# Patient Record
Sex: FEMALE | Race: WHITE | NOT HISPANIC OR LATINO | Employment: OTHER | URBAN - METROPOLITAN AREA
[De-identification: names, ages, dates, MRNs, and addresses within clinical notes are randomized per-mention and may not be internally consistent; named-entity substitution may affect disease eponyms.]

---

## 2021-06-24 ENCOUNTER — TRANSCRIBE ORDERS (OUTPATIENT)
Dept: LAB | Facility: SURGERY CENTER | Age: 61
End: 2021-06-24

## 2021-06-24 ENCOUNTER — PREP FOR SURGERY (OUTPATIENT)
Dept: SURGERY | Facility: SURGERY CENTER | Age: 61
End: 2021-06-24

## 2021-06-24 ENCOUNTER — OFFICE VISIT (OUTPATIENT)
Dept: GASTROENTEROLOGY | Facility: CLINIC | Age: 61
End: 2021-06-24

## 2021-06-24 VITALS
TEMPERATURE: 97.1 F | BODY MASS INDEX: 27.25 KG/M2 | HEART RATE: 74 BPM | WEIGHT: 184 LBS | DIASTOLIC BLOOD PRESSURE: 90 MMHG | OXYGEN SATURATION: 97 % | HEIGHT: 69 IN | SYSTOLIC BLOOD PRESSURE: 132 MMHG

## 2021-06-24 DIAGNOSIS — Z15.09 MONOALLELIC MUTATION OF CHEK2 GENE IN FEMALE PATIENT: Primary | ICD-10-CM

## 2021-06-24 DIAGNOSIS — Z31.438 OTHER GENETIC TESTING OF FEMALE: Primary | ICD-10-CM

## 2021-06-24 DIAGNOSIS — Z01.818 OTHER SPECIFIED PRE-OPERATIVE EXAMINATION: Primary | ICD-10-CM

## 2021-06-24 DIAGNOSIS — Z12.11 ENCOUNTER FOR SCREENING FOR MALIGNANT NEOPLASM OF COLON: ICD-10-CM

## 2021-06-24 DIAGNOSIS — Z15.09 MONOALLELIC MUTATION OF CHEK2 GENE IN FEMALE PATIENT: ICD-10-CM

## 2021-06-24 DIAGNOSIS — Z15.89 MONOALLELIC MUTATION OF CHEK2 GENE IN FEMALE PATIENT: ICD-10-CM

## 2021-06-24 DIAGNOSIS — Z15.01 MONOALLELIC MUTATION OF CHEK2 GENE IN FEMALE PATIENT: Primary | ICD-10-CM

## 2021-06-24 DIAGNOSIS — Z15.89 MONOALLELIC MUTATION OF CHEK2 GENE IN FEMALE PATIENT: Primary | ICD-10-CM

## 2021-06-24 DIAGNOSIS — Z15.02 MONOALLELIC MUTATION OF CHEK2 GENE IN FEMALE PATIENT: Primary | ICD-10-CM

## 2021-06-24 DIAGNOSIS — Z31.438 OTHER GENETIC TESTING OF FEMALE: ICD-10-CM

## 2021-06-24 DIAGNOSIS — Z15.01 MONOALLELIC MUTATION OF CHEK2 GENE IN FEMALE PATIENT: ICD-10-CM

## 2021-06-24 DIAGNOSIS — Z15.02 MONOALLELIC MUTATION OF CHEK2 GENE IN FEMALE PATIENT: ICD-10-CM

## 2021-06-24 PROCEDURE — 99244 OFF/OP CNSLTJ NEW/EST MOD 40: CPT | Performed by: INTERNAL MEDICINE

## 2021-06-24 RX ORDER — SODIUM CHLORIDE, SODIUM LACTATE, POTASSIUM CHLORIDE, CALCIUM CHLORIDE 600; 310; 30; 20 MG/100ML; MG/100ML; MG/100ML; MG/100ML
30 INJECTION, SOLUTION INTRAVENOUS CONTINUOUS PRN
Status: CANCELLED | OUTPATIENT
Start: 2021-06-24

## 2021-06-24 RX ORDER — PROPRANOLOL HYDROCHLORIDE 20 MG/1
20 TABLET ORAL DAILY
COMMUNITY
Start: 2021-04-19

## 2021-06-24 RX ORDER — METAXALONE 800 MG/1
800 TABLET ORAL 3 TIMES DAILY
COMMUNITY
Start: 2009-08-01

## 2021-06-24 RX ORDER — SODIUM CHLORIDE 0.9 % (FLUSH) 0.9 %
10 SYRINGE (ML) INJECTION AS NEEDED
Status: CANCELLED | OUTPATIENT
Start: 2021-06-24

## 2021-06-24 RX ORDER — SODIUM CHLORIDE 0.9 % (FLUSH) 0.9 %
3 SYRINGE (ML) INJECTION EVERY 12 HOURS SCHEDULED
Status: CANCELLED | OUTPATIENT
Start: 2021-06-24

## 2021-06-24 RX ORDER — CLINDAMYCIN PHOSPHATE 10 UG/ML
LOTION TOPICAL
COMMUNITY
Start: 2021-03-29

## 2021-06-24 RX ORDER — ESTRADIOL 0.05 MG/D
PATCH TRANSDERMAL
COMMUNITY
Start: 2021-06-16

## 2021-06-24 RX ORDER — SUCRALFATE 1 G/1
1 TABLET ORAL 3 TIMES DAILY
COMMUNITY
Start: 2021-03-24

## 2021-06-24 RX ORDER — RABEPRAZOLE SODIUM 20 MG/1
20 TABLET, DELAYED RELEASE ORAL EVERY MORNING
COMMUNITY
Start: 2021-04-21

## 2021-06-24 RX ORDER — CYANOCOBALAMIN (VITAMIN B-12) 500 MCG
LOZENGE ORAL
COMMUNITY

## 2021-06-24 RX ORDER — LEVOTHYROXINE SODIUM 0.1 MG/1
100 TABLET ORAL DAILY
COMMUNITY
Start: 2021-06-02

## 2021-06-24 RX ORDER — SOLIFENACIN SUCCINATE 5 MG/1
5 TABLET, FILM COATED ORAL DAILY
COMMUNITY
Start: 2021-06-03 | End: 2022-01-27

## 2021-06-24 NOTE — PROGRESS NOTES
"Chief Complaint   Patient presents with   • other     Discuss-Genetic testing and colonoscopy        {CC  Problem List  Visit Diagnosis   Encounters  Notes  Medications  Labs  Result Review Imaging  Media :23}   History of Present Illness  60-year-old female presents today for abnormal genetic testing.  She has had CHEK-2 positive genetic testing.  No family history of colon cancer or colon polyps.    She has longstanding history of IBS abdominal pain.  Previous evaluation with Dr. Jade September 2018 at [facility] for longstanding IBS and acid reflux.    She has had numerous surgeries including brain cyst, ear surgery and numerous GI surgeries with multiple adhesions and reports that she has never had a colonoscopy before.    She has had several Cologuard's in the past that have been normal.     She has regular bowel movements and works hard to avoid constipation or symptoms concerning for small bowel obstruction given her history of bowel obstruction.    Acid reflux is controlled with daily acid medication.  She denies pain or trouble with swallowing.    Review of Systems     Result Review :         Vital Signs:   /90   Pulse 74   Temp 97.1 °F (36.2 °C)   Ht 174 cm (68.5\")   Wt 83.5 kg (184 lb)   SpO2 97%   BMI 27.57 kg/m²     Body mass index is 27.57 kg/m².     Physical Exam  Vitals reviewed.   Constitutional:       Appearance: Normal appearance.   HENT:      Nose: No nasal deformity.   Eyes:      General: No scleral icterus.  Neck:      Comments: Trachea midline.  Cardiovascular:      Rate and Rhythm: Normal rate and regular rhythm.   Pulmonary:      Effort: No respiratory distress.      Breath sounds: Normal breath sounds.   Abdominal:      General: Bowel sounds are normal. There is no distension.      Palpations: Abdomen is soft. There is no mass.      Tenderness: There is no abdominal tenderness.   Lymphadenopathy:      Comments: No periumbilical lymphadenopathy.   Skin:     General: Skin " is warm.   Neurological:      Mental Status: She is alert.         Assessment and Plan    Diagnoses and all orders for this visit:    1. 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 Other genetic testing of female    3. Encounter for screening for malignant neoplasm of colon       Pleasant 60-year-old female presents today for consult regarding abnormal genetic testing discussed testing as this carries increased risk for cancers including colon.  Discussed risks and benefit of endoscopic evaluation with abnormal genetic testing.  Given significant history of scar tissue, will proceed cautiously as discussed.  Unfortunately abnormal genetic testing typically comes with the recommendation for endoscopic evaluation every 1 to 2 years and patient's ultimate goal is to avoid invasive procedures given significant surgical history and adhesions if at all possible.    Given longstanding reflux, will proceed with EGD and colonoscopy for abnormal genetic testing and colon cancer screening.  Based on findings of colonoscopy, will make additional recommendations.    Patient verbalized agreement and understanding with the above plan, all questions answered and support provided.    I have reviewed and confirmed the accuracy of the HPI and Assessment and Plan as documented by the APRN Grayson Zacarias MD

## 2021-10-04 ENCOUNTER — LAB (OUTPATIENT)
Dept: LAB | Facility: SURGERY CENTER | Age: 61
End: 2021-10-04

## 2021-10-04 DIAGNOSIS — Z01.818 OTHER SPECIFIED PRE-OPERATIVE EXAMINATION: ICD-10-CM

## 2021-10-04 LAB — SARS-COV-2 ORF1AB RESP QL NAA+PROBE: NOT DETECTED

## 2021-10-04 PROCEDURE — U0004 COV-19 TEST NON-CDC HGH THRU: HCPCS | Performed by: SURGERY

## 2021-10-04 PROCEDURE — C9803 HOPD COVID-19 SPEC COLLECT: HCPCS

## 2021-10-04 NOTE — SIGNIFICANT NOTE
Education provided the Patient on the following:    - Nothing to Eat or Drink after MN the night before the procedure  -Your required COVID Test is Scheduled on          Between the Hours of [id number]  -You will only be notified if your COVID test Result is POSITIVE  -The importance of reducing your number of contacts by self quarantining after you COVID test until the date of your Colonoscopy and EGD    - Avoid red/purple fluids while completing their bowel prep as ordered by physician  -Contact Gastrointerologist office for any questions about specific details regarding colon prep    -You will need to have someone drive you home after your colonoscopy and remain with you for 24 hours after the procedure  - The date of your procedure, your are welcome to have one visitor at bedside or remain within 10-15 minutes of [facility]  -Please wear warm socks when you arrive for your procedure  -Remove all jewelry and leave any valuables before arriving the day of your procedure (all will have to be removed before leaving preop)  -You will need to arrive at    7:15       on     10/6      for your procedure    -Feel free to contact us at: [phone] with any additional questions/concerns

## 2021-10-06 ENCOUNTER — ANESTHESIA (OUTPATIENT)
Dept: SURGERY | Facility: SURGERY CENTER | Age: 61
End: 2021-10-06

## 2021-10-06 ENCOUNTER — HOSPITAL ENCOUNTER (OUTPATIENT)
Facility: SURGERY CENTER | Age: 61
Setting detail: HOSPITAL OUTPATIENT SURGERY
Discharge: HOME | End: 2021-10-06
Attending: INTERNAL MEDICINE | Admitting: INTERNAL MEDICINE

## 2021-10-06 ENCOUNTER — ANESTHESIA EVENT (OUTPATIENT)
Dept: SURGERY | Facility: SURGERY CENTER | Age: 61
End: 2021-10-06

## 2021-10-06 VITALS
SYSTOLIC BLOOD PRESSURE: 95 MMHG | WEIGHT: 176.2 LBS | OXYGEN SATURATION: 96 % | TEMPERATURE: 97.8 F | RESPIRATION RATE: 16 BRPM | DIASTOLIC BLOOD PRESSURE: 62 MMHG | HEART RATE: 65 BPM | HEIGHT: 69 IN | BODY MASS INDEX: 26.1 KG/M2

## 2021-10-06 DIAGNOSIS — Z12.11 ENCOUNTER FOR SCREENING FOR MALIGNANT NEOPLASM OF COLON: ICD-10-CM

## 2021-10-06 DIAGNOSIS — Z15.09 MONOALLELIC MUTATION OF CHEK2 GENE IN FEMALE PATIENT: ICD-10-CM

## 2021-10-06 DIAGNOSIS — Z15.89 MONOALLELIC MUTATION OF CHEK2 GENE IN FEMALE PATIENT: ICD-10-CM

## 2021-10-06 DIAGNOSIS — Z31.438 OTHER GENETIC TESTING OF FEMALE: ICD-10-CM

## 2021-10-06 DIAGNOSIS — Z15.01 MONOALLELIC MUTATION OF CHEK2 GENE IN FEMALE PATIENT: ICD-10-CM

## 2021-10-06 DIAGNOSIS — Z15.02 MONOALLELIC MUTATION OF CHEK2 GENE IN FEMALE PATIENT: ICD-10-CM

## 2021-10-06 PROCEDURE — 45378 DIAGNOSTIC COLONOSCOPY: CPT | Performed by: INTERNAL MEDICINE

## 2021-10-06 PROCEDURE — 0DJD8ZZ INSPECTION OF LOWER INTESTINAL TRACT, VIA NATURAL OR ARTIFICIAL OPENING ENDOSCOPIC: ICD-10-PCS | Performed by: NURSE PRACTITIONER

## 2021-10-06 PROCEDURE — 25010000002 PROPOFOL 10 MG/ML EMULSION: Performed by: ANESTHESIOLOGY

## 2021-10-06 PROCEDURE — 43235 EGD DIAGNOSTIC BRUSH WASH: CPT | Performed by: NURSE PRACTITIONER

## 2021-10-06 PROCEDURE — 43235 EGD DIAGNOSTIC BRUSH WASH: CPT | Performed by: INTERNAL MEDICINE

## 2021-10-06 PROCEDURE — 45378 DIAGNOSTIC COLONOSCOPY: CPT | Performed by: NURSE PRACTITIONER

## 2021-10-06 PROCEDURE — 0DJ08ZZ INSPECTION OF UPPER INTESTINAL TRACT, VIA NATURAL OR ARTIFICIAL OPENING ENDOSCOPIC: ICD-10-PCS | Performed by: NURSE PRACTITIONER

## 2021-10-06 RX ORDER — FENTANYL CITRATE 50 UG/ML
50 INJECTION, SOLUTION INTRAMUSCULAR; INTRAVENOUS
Status: DISCONTINUED | OUTPATIENT
Start: 2021-10-06 | End: 2021-10-06 | Stop reason: HOSPADM

## 2021-10-06 RX ORDER — FLUMAZENIL 0.1 MG/ML
0.2 INJECTION INTRAVENOUS AS NEEDED
Status: DISCONTINUED | OUTPATIENT
Start: 2021-10-06 | End: 2021-10-06 | Stop reason: HOSPADM

## 2021-10-06 RX ORDER — DIPHENHYDRAMINE HYDROCHLORIDE 50 MG/ML
12.5 INJECTION INTRAMUSCULAR; INTRAVENOUS
Status: DISCONTINUED | OUTPATIENT
Start: 2021-10-06 | End: 2021-10-06 | Stop reason: HOSPADM

## 2021-10-06 RX ORDER — MAGNESIUM HYDROXIDE 1200 MG/15ML
LIQUID ORAL AS NEEDED
Status: DISCONTINUED | OUTPATIENT
Start: 2021-10-06 | End: 2021-10-06 | Stop reason: HOSPADM

## 2021-10-06 RX ORDER — PROPOFOL 10 MG/ML
VIAL (ML) INTRAVENOUS AS NEEDED
Status: DISCONTINUED | OUTPATIENT
Start: 2021-10-06 | End: 2021-10-06 | Stop reason: SURG

## 2021-10-06 RX ORDER — LIDOCAINE HYDROCHLORIDE 20 MG/ML
INJECTION, SOLUTION INFILTRATION; PERINEURAL AS NEEDED
Status: DISCONTINUED | OUTPATIENT
Start: 2021-10-06 | End: 2021-10-06 | Stop reason: SURG

## 2021-10-06 RX ORDER — NALOXONE HCL 0.4 MG/ML
0.2 VIAL (ML) INJECTION AS NEEDED
Status: DISCONTINUED | OUTPATIENT
Start: 2021-10-06 | End: 2021-10-06 | Stop reason: HOSPADM

## 2021-10-06 RX ORDER — SODIUM CHLORIDE, SODIUM LACTATE, POTASSIUM CHLORIDE, CALCIUM CHLORIDE 600; 310; 30; 20 MG/100ML; MG/100ML; MG/100ML; MG/100ML
30 INJECTION, SOLUTION INTRAVENOUS CONTINUOUS PRN
Status: DISCONTINUED | OUTPATIENT
Start: 2021-10-06 | End: 2021-10-06 | Stop reason: HOSPADM

## 2021-10-06 RX ORDER — SODIUM CHLORIDE 0.9 % (FLUSH) 0.9 %
10 SYRINGE (ML) INJECTION AS NEEDED
Status: DISCONTINUED | OUTPATIENT
Start: 2021-10-06 | End: 2021-10-06 | Stop reason: HOSPADM

## 2021-10-06 RX ORDER — SODIUM CHLORIDE 0.9 % (FLUSH) 0.9 %
3 SYRINGE (ML) INJECTION EVERY 12 HOURS SCHEDULED
Status: DISCONTINUED | OUTPATIENT
Start: 2021-10-06 | End: 2021-10-06 | Stop reason: HOSPADM

## 2021-10-06 RX ORDER — IBUPROFEN 200 MG
600 TABLET ORAL ONCE AS NEEDED
Status: DISCONTINUED | OUTPATIENT
Start: 2021-10-06 | End: 2021-10-06 | Stop reason: HOSPADM

## 2021-10-06 RX ORDER — DIPHENHYDRAMINE HCL 25 MG
25 TABLET ORAL
Status: DISCONTINUED | OUTPATIENT
Start: 2021-10-06 | End: 2021-10-06 | Stop reason: HOSPADM

## 2021-10-06 RX ADMIN — SODIUM CHLORIDE, POTASSIUM CHLORIDE, SODIUM LACTATE AND CALCIUM CHLORIDE 30 ML/HR: 600; 310; 30; 20 INJECTION, SOLUTION INTRAVENOUS at 07:50

## 2021-10-06 RX ADMIN — PROPOFOL 140 MCG/KG/MIN: 10 INJECTION, EMULSION INTRAVENOUS at 08:42

## 2021-10-06 RX ADMIN — PROPOFOL 100 MG: 10 INJECTION, EMULSION INTRAVENOUS at 08:40

## 2021-10-06 RX ADMIN — LIDOCAINE HYDROCHLORIDE 100 MG: 20 INJECTION, SOLUTION INFILTRATION; PERINEURAL at 08:40

## 2021-10-06 NOTE — ANESTHESIA PREPROCEDURE EVALUATION
" Anesthesia Evaluation     Patient summary reviewed and Nursing notes reviewed   no history of anesthetic complications:  NPO Solid Status: > 8 hours  NPO Liquid Status: > 2 hours           Airway   Mallampati: II  Dental - normal exam     Pulmonary    (+) a smoker Former,   Cardiovascular - negative cardio ROS  Exercise tolerance: good (4-7 METS)        Neuro/Psych- negative ROS  GI/Hepatic/Renal/Endo    (+)  GERD,  thyroid problem hypothyroidism    Musculoskeletal (-) negative ROS    Abdominal    Substance History - negative use     OB/GYN negative ob/gyn ROS         Other      history of cancer                    Anesthesia Plan    ASA 2     MAC   (/89 (BP Location: Left arm, Patient Position: Lying)   Pulse 81   Temp 36.2 °C (97.1 °F) (Temporal)   Resp 16   Ht 175.3 cm (69\")   Wt 79.9 kg (176 lb 3.2 oz)   SpO2 96%   BMI 26.02 kg/m²     I have reviewed the patient's history with the patient and the chart, including all pertinent laboratory results and imaging. I have explained the risks of anesthesia including but not limited to dental damage, corneal abrasion, nerve injury, MI, stroke, and death.    )    Anesthetic plan, all risks, benefits, and alternatives have been provided, discussed and informed consent has been obtained with: patient.      "

## 2021-10-06 NOTE — H&P
No chief complaint on file.      HPI  CHEK 2 mutation  Adhesions           Problem List:    Patient Active Problem List   Diagnosis   • Other genetic testing of female   • Monoallelic mutation of CHEK2 gene in female patient   • Encounter for screening for malignant neoplasm of colon       Medical History:    Past Medical History:   Diagnosis Date   • Cataracts, bilateral    • GERD (gastroesophageal reflux disease)    • Hashimoto's thyroiditis    • Ovarian ca (HCC)    • Small bowel obstruction (HCC)         Social History:    Social History     Socioeconomic History   • Marital status: Single     Spouse name: Not on file   • Number of children: Not on file   • Years of education: Not on file   • Highest education level: Not on file   Tobacco Use   • Smoking status: Former Smoker     Packs/day: 0.50     Years: 20.00     Pack years: 10.00     Types: Cigarettes     Quit date: 2005     Years since quittin.7   • Smokeless tobacco: Never Used   • Tobacco comment:    Vaping Use   • Vaping Use: Never used   Substance and Sexual Activity   • Alcohol use: Yes     Alcohol/week: 5.0 standard drinks     Types: 5 Glasses of wine per week     Comment: SOC   • Drug use: Never   • Sexual activity: Not Currently     Partners: Male     Birth control/protection: Post-menopausal     Comment: hysterectomy at 32       Family History:   Family History   Problem Relation Age of Onset   • Colon cancer Neg Hx    • Colon polyps Neg Hx    • Crohn's disease Neg Hx    • Irritable bowel syndrome Neg Hx    • Ulcerative colitis Neg Hx        Surgical History:   Past Surgical History:   Procedure Laterality Date   • ABDOMINAL SURGERY  1996    Small bowel obstruction   • ARTHROPLASTY      [facility]   • BRAIN TUMOR EXCISION     • BREAST SURGERY      Left- Biposy   • HYSTERECTOMY  1992    radical hyst.due to ovarian ca   • LUMBAR DISC SURGERY      Ruptured   • PAROTIDECTOMY     • TUMOR REMOVAL     • UPPER GASTROINTESTINAL ENDOSCOPY       2012       No current facility-administered medications for this encounter.    Current Outpatient Medications:   •  Climara 0.05 MG/24HR patch, UNWRAP AND APPLY 1 PATCH ONCE A WEEK, Disp: , Rfl:   •  clindamycin (CLEOCIN T) 1 % lotion, APPLY TO FACE EVERY MORNING, Disp: , Rfl:   •  Diclofenac Sodium (VOLTAREN) 1 % gel gel, Place  on the skin as directed by provider 4 (Four) Times a Day., Disp: , Rfl:   •  hyoscyamine (LEVSIN) 0.125 MG SL tablet, DISSOLVE 1 TO 2 TABLETS UNDER THE TONGUE EVERY 4 HOURS AS NEEDED, Disp: , Rfl:   •  levothyroxine (SYNTHROID, LEVOTHROID) 100 MCG tablet, Take 100 mcg by mouth Daily., Disp: , Rfl:   •  metaxalone (SKELAXIN) 800 MG tablet, Take 800 mg by mouth 3 (Three) Times a Day., Disp: , Rfl:   •  propranolol (INDERAL) 20 MG tablet, Take 20 mg by mouth Daily., Disp: , Rfl:   •  RABEprazole (ACIPHEX) 20 MG EC tablet, Take 20 mg by mouth Every Morning., Disp: , Rfl:   •  solifenacin (VESICARE) 5 MG tablet, Take 5 mg by mouth Daily., Disp: , Rfl:   •  sucralfate (CARAFATE) 1 g tablet, Take 1 g by mouth 3 (Three) Times a Day., Disp: , Rfl:   •  tretinoin (RETIN-A) 0.025 % cream, USE FOR ACNE ON FACE EVERY EVENING, Disp: , Rfl:   •  Vitamin E 400 units tablet, Take  by mouth., Disp: , Rfl:     Allergies:   Allergies   Allergen Reactions   • Codeine Hives   • Relafen [Nabumetone] Hives   • Shellfish-Derived Products Other (See Comments)     Unknown   • Morphine GI Intolerance        The following portions of the patient's history were reviewed by me and updated as appropriate: review of systems, allergies, current medications, past family history, past medical history, past social history, past surgical history and problem list.    There were no vitals filed for this visit.    PHYSICAL EXAM:    CONSTITUTIONAL:  today's vital signs reviewed by me  GASTROINTESTINAL: abdomen is soft nontender nondistended with normal active bowel sounds, no masses are appreciated    Assessment/ Plan  CHECK 2  mutation    egd and colonoscopy     Risks and benefits as well as alternatives to endoscopic evaluation were explained to the patient and they voiced understanding and wish to proceed.  These risks include but are not limited to the risk of bleeding, perforation, adverse reaction to sedation, and missed lesions.  The patient was given the opportunity to ask questions prior to the endoscopic procedure.

## 2021-10-06 NOTE — ANESTHESIA POSTPROCEDURE EVALUATION
"Patient: Mami Obando    Procedure Summary     Date: 10/06/21 Room / Location: [facility]    Anesthesia Start: 0838 Anesthesia Stop: 0905    Procedures:       ESOPHAGOGASTRODUODENOSCOPY (N/A )      COLONOSCOPY FOR SCREENING (N/A ) Diagnosis:       Other genetic testing of female      Monoallelic mutation of CHEK2 gene in female patient      Encounter for screening for malignant neoplasm of colon      (Other genetic testing of female [Z31.438])      (Monoallelic mutation of CHEK2 gene in female patient [Z15.01, Z15.89, Z15.09, Z15.02])      (Encounter for screening for malignant neoplasm of colon [Z12.11])    Surgeons: Grayson Zacarias MD Provider: Karen Whitley MD    Anesthesia Type: MAC ASA Status: 2          Anesthesia Type: MAC    Vitals  No vitals data found for the desired time range.          Post Anesthesia Care and Evaluation    Patient location during evaluation: bedside  Patient participation: complete - patient participated  Level of consciousness: awake  Pain management: adequate  Airway patency: patent  Anesthetic complications: No anesthetic complications  PONV Status: controlled  Cardiovascular status: acceptable  Respiratory status: acceptable  Hydration status: acceptable    Comments: /89 (BP Location: Left arm, Patient Position: Lying)   Pulse 81   Temp 36.2 °C (97.1 °F) (Temporal)   Resp 16   Ht 175.3 cm (69\")   Wt 79.9 kg (176 lb 3.2 oz)   SpO2 96%   BMI 26.02 kg/m²         "

## 2021-10-07 DIAGNOSIS — K57.90 DIVERTICULOSIS: Primary | ICD-10-CM

## 2021-12-01 ENCOUNTER — TELEPHONE (OUTPATIENT)
Dept: GASTROENTEROLOGY | Facility: CLINIC | Age: 61
End: 2021-12-01

## 2021-12-29 DIAGNOSIS — K57.90 DIVERTICULOSIS: ICD-10-CM

## 2022-01-24 NOTE — PROGRESS NOTES
"Chief Complaint   Patient presents with   • Colonoscopy     follow up           History of Present Illness  Patient is a 61-year-old female who presents today for follow-up.  Previous genetic testing showed Mutation of Chek 2 gene EGD was performed October 2021 and was normal.  Colonoscopy was attempted and was unable to be advanced past the sigmoid colon.  She then had a virtual colonoscopy performed which was negative.    Patient presents today for follow-up.  She denies any GI complaints.  She has a history of multiple abdominal surgeries, adhesions, and has had a bowel obstruction.    She reports she had difficulty tolerating the magnesium citrate prep for the virtual colonoscopy.     Result Review :       CT Virtual Colonoscopy Diagnostic (12/16/2021)   COLONOSCOPY (10/06/2021 08:31)   UPPER GI ENDOSCOPY (10/06/2021 08:34)   Office Visit with Grayson Zacarias MD (06/24/2021)       Vital Signs:   /78   Pulse (!) 44   Temp 98.2 °F (36.8 °C)   Ht 175.3 cm (69\")   Wt 81.2 kg (179 lb)   SpO2 97%   BMI 26.43 kg/m²     Body mass index is 26.43 kg/m².     Physical Exam  Vitals reviewed.   Constitutional:       General: She is not in acute distress.     Appearance: She is well-developed.   HENT:      Head: Normocephalic and atraumatic.   Pulmonary:      Effort: Pulmonary effort is normal. No respiratory distress.   Skin:     General: Skin is dry.      Coloration: Skin is not pale.   Neurological:      Mental Status: She is alert and oriented to person, place, and time.   Psychiatric:         Thought Content: Thought content normal.           Assessment and Plan    Diagnoses and all orders for this visit:    1. Monoallelic mutation of CHEK2 gene in female patient (Primary)         Discussion  Patient presents today for follow-up after EGD, colonoscopy, and virtual colonoscopy.  Colonoscopy was unable to be advanced past the sigmoid colon, likely secondary to adhesions from prior abdominal surgeries.  CT " colonography was negative.  She does have an increased risk for colorectal cancer secondary to mutation in the CHEK2 gene.  Due to increased risk of colorectal cancer, recommended next colon cancer screening at 2 to 3-year interval.  We discussed consideration for reattempting colonoscopy at this time versus proceeding directly with CT colonography.  If CT colonography had any positive findings, would then recommend colonoscopy.  After our discussion, patient would like to proceed with CT colonography.  We will plan to complete this in the next 2 to 3 years, it can be performed sooner if she develops any new or worsening symptoms.          Follow Up   Return if symptoms worsen or fail to improve.    There are no Patient Instructions on file for this visit.

## 2022-01-27 ENCOUNTER — OFFICE VISIT (OUTPATIENT)
Dept: GASTROENTEROLOGY | Facility: CLINIC | Age: 62
End: 2022-01-27

## 2022-01-27 VITALS
HEIGHT: 69 IN | DIASTOLIC BLOOD PRESSURE: 78 MMHG | SYSTOLIC BLOOD PRESSURE: 122 MMHG | HEART RATE: 44 BPM | WEIGHT: 179 LBS | TEMPERATURE: 98.2 F | BODY MASS INDEX: 26.51 KG/M2 | OXYGEN SATURATION: 97 %

## 2022-01-27 DIAGNOSIS — Z15.02 MONOALLELIC MUTATION OF CHEK2 GENE IN FEMALE PATIENT: Primary | ICD-10-CM

## 2022-01-27 DIAGNOSIS — Z15.09 MONOALLELIC MUTATION OF CHEK2 GENE IN FEMALE PATIENT: Primary | ICD-10-CM

## 2022-01-27 DIAGNOSIS — Z15.01 MONOALLELIC MUTATION OF CHEK2 GENE IN FEMALE PATIENT: Primary | ICD-10-CM

## 2022-01-27 DIAGNOSIS — Z15.89 MONOALLELIC MUTATION OF CHEK2 GENE IN FEMALE PATIENT: Primary | ICD-10-CM

## 2022-01-27 PROCEDURE — 99214 OFFICE O/P EST MOD 30 MIN: CPT | Performed by: NURSE PRACTITIONER

## 2023-09-05 ENCOUNTER — OFFICE VISIT (OUTPATIENT)
Dept: GASTROENTEROLOGY | Facility: CLINIC | Age: 63
End: 2023-09-05
Payer: COMMERCIAL

## 2023-09-05 VITALS
HEART RATE: 62 BPM | OXYGEN SATURATION: 98 % | DIASTOLIC BLOOD PRESSURE: 80 MMHG | WEIGHT: 184.8 LBS | TEMPERATURE: 97.9 F | SYSTOLIC BLOOD PRESSURE: 130 MMHG | HEIGHT: 69 IN | BODY MASS INDEX: 27.37 KG/M2

## 2023-09-05 DIAGNOSIS — Z15.09 MONOALLELIC MUTATION OF CHEK2 GENE IN FEMALE PATIENT: Primary | ICD-10-CM

## 2023-09-05 DIAGNOSIS — Z15.02 MONOALLELIC MUTATION OF CHEK2 GENE IN FEMALE PATIENT: Primary | ICD-10-CM

## 2023-09-05 DIAGNOSIS — Z15.89 MONOALLELIC MUTATION OF CHEK2 GENE IN FEMALE PATIENT: Primary | ICD-10-CM

## 2023-09-05 DIAGNOSIS — Z15.01 MONOALLELIC MUTATION OF CHEK2 GENE IN FEMALE PATIENT: Primary | ICD-10-CM

## 2023-09-05 PROCEDURE — 99213 OFFICE O/P EST LOW 20 MIN: CPT | Performed by: NURSE PRACTITIONER

## 2023-09-05 RX ORDER — METHENAMINE, SODIUM PHOSPHATE, MONOBASIC, MONOHYDRATE, PHENYL SALICYLATE, METHYLENE BLUE, AND HYOSCYAMINE SULFATE 118; 40.8; 36; 10; .12 MG/1; MG/1; MG/1; MG/1; MG/1
CAPSULE ORAL
COMMUNITY

## 2023-09-05 RX ORDER — PRAVASTATIN SODIUM 10 MG
TABLET ORAL
COMMUNITY
Start: 2023-08-29

## 2023-09-05 RX ORDER — VIBEGRON 75 MG/1
1 TABLET, FILM COATED ORAL DAILY
COMMUNITY
Start: 2023-08-29

## 2023-09-05 RX ORDER — SODIUM FLUORIDE 5 MG/G
GEL, DENTIFRICE DENTAL
COMMUNITY

## 2023-09-05 NOTE — PROGRESS NOTES
"No chief complaint on file.        History of Present Illness  Patient is a 62-year-old female who presents today for Follow-up.  She has a history of CHEK2 gene mutation.  Most recent colonoscopy October 2021 unable to advance past the sigmoid, CT colonography to follow-up was negative.    Patient presents today for follow-up to discuss colon cancer screening.  She has a history of small bowel obstruction and has had several incomplete colonoscopies in the past due to adhesions.  She reports she has significant difficulty with bowel prep and would prefer to avoid bowel prep as long as possible.    Reports she has had a CEA and  levels drawn with her gynecologist which were normal.  She has no family history of colon cancer.    She denies any GI complaints at today's visit.  Denies heartburn, reflux, nausea, vomiting.  Denies abdominal pain.  Denies any bowel changes or blood in the stool.     Result Review :       Office Visit with Niru Nelson APRN (01/27/2022)    CT Virtual Colonoscopy Diagnostic (12/16/2021)     COLONOSCOPY (10/06/2021 08:31)     UPPER GI ENDOSCOPY (10/06/2021 08:34)     Vital Signs:   /80   Pulse 62   Temp 97.9 °F (36.6 °C)   Ht 175.3 cm (69\")   Wt 83.8 kg (184 lb 12.8 oz)   SpO2 98%   BMI 27.29 kg/m²     Body mass index is 27.29 kg/m².     Physical Exam  Vitals reviewed.   Constitutional:       General: She is not in acute distress.     Appearance: She is well-developed.   HENT:      Head: Normocephalic and atraumatic.   Pulmonary:      Effort: Pulmonary effort is normal. No respiratory distress.   Skin:     General: Skin is dry.      Coloration: Skin is not pale.   Neurological:      Mental Status: She is alert and oriented to person, place, and time.   Psychiatric:         Thought Content: Thought content normal.         Assessment and Plan    Diagnoses and all orders for this visit:    1. 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  Discussed with her today that this does come with an increased risk of colorectal cancer.  Based on currently available guidelines, colon cancer screening every 5 years is recommended.      Patient had an incomplete colonoscopy October 2021 which was followed with a CT colonography December 2021 which was negative.  Discussed with her today, feel colonoscopy is unlikely to be successful and therefore a low yield.  Discussed we could proceed with Cologuard or CT colonography at this time or as she is asymptomatic and guidelines recommend 5 years, proceed with her next screening in 2026 unless she develop any symptoms.      After our discussion, patient would prefer to proceed with next screening in 2026.  She would like to start as least invasively as possible with Cologuard, if that were positive, would then proceed with CT colonography and if any abnormalities on that exam, would need to attempt colonoscopy however this is likely to be complicated and may need to get surgery involved for potential lysis of adhesions.          Follow Up   Return if symptoms worsen or fail to improve.    Patient Instructions   Colon cancer screening due December 2026.

## (undated) DEVICE — Device

## (undated) DEVICE — BITEBLOCK OMNI BLOC

## (undated) DEVICE — GOWN PROC ENDOARMOR GI LVL3 HY/SHLD UNIV

## (undated) DEVICE — GOWN ISOL W/THUMB UNIV BLU BX/15

## (undated) DEVICE — CANN NASL CO2 TRULINK W/O2 A/

## (undated) DEVICE — KT ORCA ORCAPOD DISP STRL

## (undated) DEVICE — FLEX ADVANTAGE 1500CC: Brand: FLEX ADVANTAGE

## (undated) DEVICE — SINGLE-USE BIOPSY FORCEPS: Brand: RADIAL JAW 4